# Patient Record
(demographics unavailable — no encounter records)

---

## 2025-05-03 NOTE — HISTORY OF PRESENT ILLNESS
[postmenopausal] : postmenopausal [N] : Patient denies prior pregnancies [Menarche Age: ____] : age at menarche was [unfilled] [Menopause Age: ____] : age at menopause was [unfilled] [No] : Patient does not have concerns regarding sex [Previously active] : previously active [Men] : men [TextBox_4] : Abimbola is here for her annual exam.  she was in the ER 2 months ago for RUQ and right sided back pain.  She had imaging completed, and she was advised to f/u with gyn for an incidental finding of an ovarian cyst- records not available.  she still has the pain.  she is a - thinks it may be msk due to movement while driving. [Mammogramdate] : 4/20/2024 [TextBox_19] : BR 2 [BreastSonogramDate] : 4/20/2024 [TextBox_25] : BR 2 [PapSmeardate] : 10/22/2022 [TextBox_31] : WNL [BoneDensityDate] : 10/29/2022 [TextBox_37] : OSTEOPENIA [HPVDate] : 10/22/2022 [TextBox_78] : NEG [LMPDate] : 1/2017 [PGHxTotal] : 0 [FreeTextEntry1] : 1/2017

## 2025-05-03 NOTE — PLAN
[FreeTextEntry1] : pelvic US ordered to reassess ovarian cyst - will call with results  pap obtained  The benefits of adequate calcium and vitamin D3 intake combined with weight bearing exercise for bone protection were reviewed. mammo/sono rx provided DEXA rx provided

## 2025-05-03 NOTE — PHYSICAL EXAM
[Chaperoned Physical Exam] : A chaperone was present in the examining room during all aspects of the physical examination. [MA] : MA [FreeTextEntry2] : Melania [Appropriately responsive] : appropriately responsive [Alert] : alert [No Acute Distress] : no acute distress [Soft] : soft [Non-tender] : non-tender [Non-distended] : non-distended [No Lesions] : no lesions [No Mass] : no mass [Oriented x3] : oriented x3 [Examination Of The Breasts] : a normal appearance [No Masses] : no breast masses were palpable [Labia Majora] : normal [Labia Minora] : normal [Normal] : normal [Uterine Adnexae] : normal

## 2025-05-27 NOTE — HISTORY OF PRESENT ILLNESS
[TextBox_4] : Breast issues [Mammogramdate] : 05/17/2025 [TextBox_19] : BR2 [BreastSonogramDate] : 05/17/2025 [TextBox_25] : BR1 [PapSmeardate] : 05/03/2025 [TextBox_31] : NEG [BoneDensityDate] : 05/10/205 [TextBox_37] : OSTEOPENIA [HPVDate] : 05/03/2025 [TextBox_78] : NEG [Menarche Age: ____] : age at menarche was [unfilled]